# Patient Record
Sex: MALE | Race: WHITE
[De-identification: names, ages, dates, MRNs, and addresses within clinical notes are randomized per-mention and may not be internally consistent; named-entity substitution may affect disease eponyms.]

---

## 2017-10-26 NOTE — MRI
MRI OF LEFT THIGH PERFORMED WITHOUT CONTRAST ENHANCEMENT:

 

History: Patient has had previous hip surgery. Having midthigh weakness for two years. 

 

FINDINGS: 

A left hip prosthesis is present and causes some artifact related to the prosthesis. I do not see an
y signs of fracture or any abnormal marrow edema change that would suggest loosening. 

 

The muscular groups are symmetric. I do not appreciate any evidence for atrophy. I do not see any si
gns of any type of compartment syndrome. 

 

Some mild tendinopathy changes of the hamstring tendon origin. 

 

There are no signs of any muscle edema or suggestion of any type of denervation type process. No sup
erficial soft tissue swelling. 

 

IMPRESSION: 

Left hip prosthesis in place. No acute abnormality of the thigh. No signs of muscle atrophy. 

 

POS: OFF

## 2017-12-19 NOTE — EKG
Test Reason : 

Blood Pressure : ***/*** mmHG

Vent. Rate : 056 BPM     Atrial Rate : 056 BPM

   P-R Int : 180 ms          QRS Dur : 088 ms

    QT Int : 434 ms       P-R-T Axes : 040 107 048 degrees

   QTc Int : 418 ms

 

Sinus bradycardia

Rightward axis

Cannot rule out Anterior infarct (cited on or before 30-NOV-2016)

Abnormal ECG

Confirmed by RAPHAEL HOLDEN ([id number]) on 12/19/2017 4:41:33 PM

 

Referred By:  DEVENDRA           Confirmed By:RAPHAEL HOLDEN

## 2017-12-27 NOTE — OP
PREOPERATIVE DIAGNOSIS:  Recurrent right inguinal hernia.

 

SURGEON:  Erik Hopson M.D.

 

PROCEDURE PERFORMED:  Recurrent right inguinal hernia repair with mesh.

 

INDICATIONS:  This is a 77-year-old male who had a hernia repair about 20 years ago without mesh.  He
 had recurrent bulge.

 

FINDINGS:  He had both an indirect and a direct inguinal hernia.

 

PROCEDURE IN DETAIL:  After informed consent was obtained, the patient was taken to the operating ivelisse
m and given general mask anesthesia, placed in the supine position.  His skin was prepped and draped 
in the usual fashion.  Local anesthesia infiltrated subcutaneously and deep and a transverse right in
guinal incision was performed.  The subcu divided sharply.  There was quite a bit of scar.  Eventuall
y was able to find the external oblique fascia incised in the direction of its fibers through the ext
ernal ring.  The spermatic cord was then isolated with a Penrose drain.  There was an indirect hernia
 sac that was dissected from surrounding cord structures down to the internal ring.  He also had a di
rect inguinal hernia.  Both of these were reduced and reduction was maintained utilizing a PHS hernia
 system, which was placed in the preperitoneal space, anterior laid out, sutured to the pubic tubercl
e with a 2-0 Prolene suture, tucked under the external oblique fascia laterally.  A notch was cut out
 for the spermatic cord.  Hemostasis was assured.  The external oblique fascia closed with a running 
3-0 Vicryl.  Kenna's closed with interrupted 3-0 Vicryl and the skin closed with a running subcuticu
lar 4-0 Rapide.  Steri-Strips applied.  Sterile bandage applied.  The patient tolerated the procedure
 well and transferred to recovery in good condition.  Sponge and needle count verified correct x2.

## 2019-11-20 ENCOUNTER — HOSPITAL ENCOUNTER (OUTPATIENT)
Dept: HOSPITAL 92 - SCSMRI | Age: 79
Discharge: HOME | End: 2019-11-20
Attending: FAMILY MEDICINE
Payer: MEDICARE

## 2019-11-20 DIAGNOSIS — R15.9: ICD-10-CM

## 2019-11-20 DIAGNOSIS — M48.061: ICD-10-CM

## 2019-11-20 DIAGNOSIS — G57.22: Primary | ICD-10-CM

## 2019-11-20 PROCEDURE — 72148 MRI LUMBAR SPINE W/O DYE: CPT

## 2019-11-20 NOTE — MRI
MRI LUMBAR SPINE WITHOUT CONTRAST:

 

INDICATIONS:

Low back pain.

 

TECHNIQUE:

Multiplanar, multisequential imaging obtained.

 

FINDINGS:

The lumbar vertebrae maintain normal height. There is loss of disk space at L4-L5. Degenerative spurr
ing at L3-L4 and L4-L5. Degenerative endplate changes at L4-L5.

 

L1-L2: Mild broad-based disk bulge flattens the thecal sac. Mild facet hypertrophy. No significant ce
ntral canal or foraminal stenosis.

 

L2-L3: Mild disk bulge. Mild to moderate facet and ligamentous hypertrophy. Mild central canal stenos
is.

 

L3-L4: Broad-based disk bulge is more pronounced, flattening the thecal sac. Facet and ligamentous hy
pertrophy is moderate. These changes result in moderate central canal stenosis. No significant forami
nal stenosis.

 

L4-L5: Posterior disk bulge and posterior spurring flatten the thecal sac. Mild facet hypertrophy. Mi
ld right foraminal stenosis due to asymmetric disk osteophyte complex projecting to the right.

 

L5-S1: Minimal disk bulge. No central canal or foraminal stenosis.

 

There are prominent bilateral parapelvic cystic lesions seen involving the kidneys. When comparison i
s made to a CT of 09/23/2013, similar findings were present at that time, indicating numerous large p
arapelvic cysts.

 

IMPRESSION:

1. 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

 

2. Mild to moderate central canal stenosis at L2-L3 and L3-L4 as described.

 

POS: TPC

## 2023-07-31 ENCOUNTER — HOSPITAL ENCOUNTER (OUTPATIENT)
Dept: HOSPITAL 92 - SCSMRI | Age: 83
Discharge: HOME | End: 2023-07-31
Attending: PSYCHIATRY & NEUROLOGY
Payer: MEDICARE

## 2023-07-31 DIAGNOSIS — M48.061: Primary | ICD-10-CM

## 2023-07-31 DIAGNOSIS — M51.36: ICD-10-CM

## 2023-07-31 PROCEDURE — 72148 MRI LUMBAR SPINE W/O DYE: CPT

## 2023-08-23 ENCOUNTER — HOSPITAL ENCOUNTER (OUTPATIENT)
Dept: HOSPITAL 92 - SCSMRI | Age: 83
Discharge: HOME | End: 2023-08-23
Attending: PSYCHIATRY & NEUROLOGY
Payer: MEDICARE

## 2023-08-23 DIAGNOSIS — M48.02: Primary | ICD-10-CM

## 2023-08-23 DIAGNOSIS — M50.321: ICD-10-CM

## 2023-08-23 DIAGNOSIS — M50.323: ICD-10-CM

## 2023-08-23 DIAGNOSIS — M50.322: ICD-10-CM

## 2023-08-23 DIAGNOSIS — G95.29: ICD-10-CM

## 2023-08-23 PROCEDURE — 72141 MRI NECK SPINE W/O DYE: CPT
